# Patient Record
Sex: FEMALE | Race: ASIAN | URBAN - METROPOLITAN AREA
[De-identification: names, ages, dates, MRNs, and addresses within clinical notes are randomized per-mention and may not be internally consistent; named-entity substitution may affect disease eponyms.]

---

## 2021-06-12 ENCOUNTER — HISTORICAL (OUTPATIENT)
Dept: ADMINISTRATIVE | Facility: HOSPITAL | Age: 31
End: 2021-06-12

## 2021-06-12 LAB
ABS NEUT (OLG): 6.75 X10(3)/MCL (ref 2.1–9.2)
BASOPHILS # BLD AUTO: 0 X10(3)/MCL (ref 0–0.2)
BASOPHILS NFR BLD AUTO: 0 %
EOSINOPHIL # BLD AUTO: 0 X10(3)/MCL (ref 0–0.9)
EOSINOPHIL NFR BLD AUTO: 0 %
ERYTHROCYTE [DISTWIDTH] IN BLOOD BY AUTOMATED COUNT: 24.2 % (ref 11.5–17)
GROUP & RH: NORMAL
HCT VFR BLD AUTO: 39.9 % (ref 37–47)
HGB BLD-MCNC: 12.9 GM/DL (ref 12–16)
LYMPHOCYTES # BLD AUTO: 2.5 X10(3)/MCL (ref 0.6–4.6)
LYMPHOCYTES NFR BLD AUTO: 25 %
MCH RBC QN AUTO: 25.1 PG (ref 27–31)
MCHC RBC AUTO-ENTMCNC: 32.3 GM/DL (ref 33–36)
MCV RBC AUTO: 77.8 FL (ref 80–94)
MONOCYTES # BLD AUTO: 0.6 X10(3)/MCL (ref 0.1–1.3)
MONOCYTES NFR BLD AUTO: 6 %
NEUTROPHILS # BLD AUTO: 6.75 X10(3)/MCL (ref 2.1–9.2)
NEUTROPHILS NFR BLD AUTO: 68 %
PLATELET # BLD AUTO: 189 X10(3)/MCL (ref 130–400)
PMV BLD AUTO: ABNORMAL FL (ref 9.4–12.4)
RBC # BLD AUTO: 5.13 X10(6)/MCL (ref 4.2–5.4)
T PALLIDUM AB SER QL: NONREACTIVE
WBC # SPEC AUTO: 10 X10(3)/MCL (ref 4.5–11.5)

## 2022-04-30 NOTE — H&P
Patient:   Airam Abernathy            MRN: [id number]            FIN: [id number]               Age:   30 years     Sex:  Female     :  1990   Associated Diagnoses:   None   Author:   Aysha Villalta MD      Health Status   The H&P was reviewed, the patient was examined, and the following changes to the patient's condition are noted.   31 y/o  at 40 wga admitted in labor  SVE /-2 AROM clear fluid  fetal status reassuring  Monitor closely

## 2022-04-30 NOTE — DISCHARGE SUMMARY
Patient:   Airam Abernathy            MRN: [id number]            FIN: [id number]               Age:   30 years     Sex:  Female     :  1990   Associated Diagnoses:   None   Author:   Melissa Hickman MD      Admission Date:   21  Discharge Date:    21    Principle Diagnosis:  IUP at  term, -- delivered  Principle Procedure:      Physical exam:  Vitals:Vital Signs (last 24 hrs)_____  Last Charted___________  Temp Oral     36.4 DegC  ( 07:55)  Heart Rate Peripheral   74 bpm  ( 07:55)  Resp Rate         17 br/min  ( 07:55)  SBP      104 mmHg  ( 07:55)  DBP      63 mmHg  ( 07:55)    Gen: A&O x 4, ambulating in room  Abd: FFNT  Ext: no edema    Brief History and Hospital course:   Admitted to L&D for labor-- normal   Admitted to Postpartum floor where she underwent an uncomplicated postpartum course  Discharged home on postpartum# 1 with plans to followup [facility] in 6 weeks for PP check

## 2022-04-30 NOTE — OP NOTE
Patient:   Airam Abernathy            MRN: [id number]            FIN: [id number]               Age:   30 years     Sex:  Female     :  1990   Associated Diagnoses:   None   Author:   Aysha Villalta MD         Delivery Note:     of a live female,  Apgars 9/9. Delivered OA, compound presentation with hand, body delivered without difficulty. Cord clamped and cut. Baby placed on mother's chest for skin to skin with stork nurse at bedside. Placenta delivered spontaneously, intact. Fundus firm, minimal bleeding. Placenta appears intact with 3 vessel cord, central insertion. Perineum and vagina inspected  small 2nd degree perineal laceration repaired with 2-0 chromic suture in the usual fashion. EBL 200cc. Hemostasis. Pt tolerated procedure well, recovering in LDR.